# Patient Record
Sex: MALE | Race: WHITE | URBAN - METROPOLITAN AREA
[De-identification: names, ages, dates, MRNs, and addresses within clinical notes are randomized per-mention and may not be internally consistent; named-entity substitution may affect disease eponyms.]

---

## 2024-04-10 ENCOUNTER — TELEPHONE (OUTPATIENT)
Dept: TRANSPLANT | Facility: CLINIC | Age: 39
End: 2024-04-10
Payer: COMMERCIAL

## 2024-04-10 ENCOUNTER — TELEPHONE (OUTPATIENT)
Dept: HEPATOLOGY | Facility: CLINIC | Age: 39
End: 2024-04-10
Payer: COMMERCIAL

## 2024-04-10 NOTE — TELEPHONE ENCOUNTER
----- Message from Jody Mast sent at 4/10/2024 12:28 PM CDT -----  Contact: Dr Chatman    HCV referral rec'roland and scanned into media. Sent to [facility] staff for scheduling.     Referring Provider: Alfredo Chatman MD  Phone: [phone]  Fax: [phone]  .    ----- Message -----  From: Elizabeth Martinez  Sent: 4/10/2024  10:15 AM CDT  To: [facility] Liver Referral Pool    4/10/24    Referral and records scanned into media mgr, phone .[phone] (home)       Thanks      Elizabeth    CC

## 2024-04-10 NOTE — TELEPHONE ENCOUNTER
----- Message from Jody Mast sent at 4/10/2024 12:28 PM CDT -----  Contact: Dr Chatman    HCV referral rec'roland and scanned into media. Sent to [facility] staff for scheduling.     Referring Provider: Alfredo Chatman MD  Phone: [phone]  Fax: [phone]  .    ----- Message -----  From: Elizabeth Martinez  Sent: 4/10/2024  10:15 AM CDT  To: [facility] Liver Referral Pool    4/10/24    Referral and records scanned into media mgr, phone .[phone] (home)       Thanks      Elizabeth    CC

## 2024-04-10 NOTE — TELEPHONE ENCOUNTER
Patient hep c quant positive.  I spoke with him.  He states that he will setup a Larry account and call me back to setup a virtual visit with PA Scheuermann.